# Patient Record
Sex: FEMALE | Race: WHITE | HISPANIC OR LATINO | Employment: FULL TIME | URBAN - METROPOLITAN AREA
[De-identification: names, ages, dates, MRNs, and addresses within clinical notes are randomized per-mention and may not be internally consistent; named-entity substitution may affect disease eponyms.]

---

## 2022-08-10 ENCOUNTER — LAB REQUISITION (OUTPATIENT)
Dept: LAB | Facility: CLINIC | Age: 42
End: 2022-08-10

## 2022-08-10 DIAGNOSIS — Z00.00 ENCOUNTER FOR GENERAL ADULT MEDICAL EXAMINATION WITHOUT ABNORMAL FINDINGS: ICD-10-CM

## 2022-08-10 LAB
CHOLEST SERPL-MCNC: 159 MG/DL
HDLC SERPL-MCNC: 64 MG/DL
LDLC SERPL CALC-MCNC: 82 MG/DL
NONHDLC SERPL-MCNC: 95 MG/DL
TRIGL SERPL-MCNC: 63 MG/DL

## 2022-08-10 PROCEDURE — 83718 ASSAY OF LIPOPROTEIN: CPT | Performed by: FAMILY MEDICINE

## 2022-08-11 ENCOUNTER — MEDICAL CORRESPONDENCE (OUTPATIENT)
Dept: HEALTH INFORMATION MANAGEMENT | Facility: CLINIC | Age: 42
End: 2022-08-11

## 2022-08-16 ENCOUNTER — TRANSCRIBE ORDERS (OUTPATIENT)
Dept: OTHER | Age: 42
End: 2022-08-16

## 2022-08-16 DIAGNOSIS — R20.2 NUMBNESS AND TINGLING OF BOTH UPPER EXTREMITIES: Primary | ICD-10-CM

## 2022-08-16 DIAGNOSIS — R20.0 NUMBNESS AND TINGLING OF BOTH UPPER EXTREMITIES: Primary | ICD-10-CM

## 2023-10-10 ENCOUNTER — APPOINTMENT (OUTPATIENT)
Dept: CT IMAGING | Facility: CLINIC | Age: 43
End: 2023-10-10
Attending: EMERGENCY MEDICINE
Payer: COMMERCIAL

## 2023-10-10 ENCOUNTER — HOSPITAL ENCOUNTER (EMERGENCY)
Facility: CLINIC | Age: 43
Discharge: HOME | End: 2023-10-10
Attending: EMERGENCY MEDICINE | Admitting: EMERGENCY MEDICINE
Payer: COMMERCIAL

## 2023-10-10 VITALS
SYSTOLIC BLOOD PRESSURE: 143 MMHG | BODY MASS INDEX: 32.14 KG/M2 | WEIGHT: 200 LBS | HEART RATE: 81 BPM | HEIGHT: 66 IN | RESPIRATION RATE: 16 BRPM | OXYGEN SATURATION: 100 % | DIASTOLIC BLOOD PRESSURE: 88 MMHG | TEMPERATURE: 97.7 F

## 2023-10-10 DIAGNOSIS — M54.2 CHRONIC NECK PAIN: Primary | ICD-10-CM

## 2023-10-10 DIAGNOSIS — G89.29 CHRONIC NECK PAIN: Primary | ICD-10-CM

## 2023-10-10 PROCEDURE — 250N000013 HC RX MED GY IP 250 OP 250 PS 637: Performed by: EMERGENCY MEDICINE

## 2023-10-10 PROCEDURE — 99284 EMERGENCY DEPT VISIT MOD MDM: CPT | Mod: 25 | Performed by: EMERGENCY MEDICINE

## 2023-10-10 PROCEDURE — 72128 CT CHEST SPINE W/O DYE: CPT

## 2023-10-10 PROCEDURE — 99284 EMERGENCY DEPT VISIT MOD MDM: CPT | Performed by: EMERGENCY MEDICINE

## 2023-10-10 PROCEDURE — 72125 CT NECK SPINE W/O DYE: CPT | Mod: 26 | Performed by: RADIOLOGY

## 2023-10-10 PROCEDURE — 72128 CT CHEST SPINE W/O DYE: CPT | Mod: 26 | Performed by: RADIOLOGY

## 2023-10-10 PROCEDURE — 72125 CT NECK SPINE W/O DYE: CPT

## 2023-10-10 RX ORDER — PREGABALIN 75 MG/1
75 CAPSULE ORAL 3 TIMES DAILY
COMMUNITY

## 2023-10-10 RX ORDER — CYCLOBENZAPRINE HCL 10 MG
10 TABLET ORAL 3 TIMES DAILY PRN
Qty: 20 TABLET | Refills: 0 | Status: SHIPPED | OUTPATIENT
Start: 2023-10-10 | End: 2023-10-16

## 2023-10-10 RX ORDER — CYCLOBENZAPRINE HCL 5 MG
10 TABLET ORAL ONCE
Status: COMPLETED | OUTPATIENT
Start: 2023-10-10 | End: 2023-10-10

## 2023-10-10 RX ORDER — IBUPROFEN 200 MG
TABLET ORAL EVERY 4 HOURS PRN
COMMUNITY

## 2023-10-10 RX ORDER — HYDROCODONE BITARTRATE AND ACETAMINOPHEN 5; 325 MG/1; MG/1
2 TABLET ORAL ONCE
Status: COMPLETED | OUTPATIENT
Start: 2023-10-10 | End: 2023-10-10

## 2023-10-10 RX ORDER — HYDROCODONE BITARTRATE AND ACETAMINOPHEN 5; 325 MG/1; MG/1
1 TABLET ORAL EVERY 6 HOURS PRN
Qty: 10 TABLET | Refills: 0 | Status: SHIPPED | OUTPATIENT
Start: 2023-10-10 | End: 2023-10-13

## 2023-10-10 RX ADMIN — CYCLOBENZAPRINE HYDROCHLORIDE 10 MG: 5 TABLET, FILM COATED ORAL at 11:20

## 2023-10-10 RX ADMIN — HYDROCODONE BITARTRATE AND ACETAMINOPHEN 2 TABLET: 5; 325 TABLET ORAL at 11:20

## 2023-10-10 ASSESSMENT — ACTIVITIES OF DAILY LIVING (ADL)
ADLS_ACUITY_SCORE: 33
ADLS_ACUITY_SCORE: 33

## 2023-10-10 NOTE — ED PROVIDER NOTES
"ED Provider Note  [facility]      History   No chief complaint on file.    HPI  Paulina Elena May is a 43 year old female who presents to the ER complaining about mid upper back pain and feeling of numbness in bilateral hands.  Patient says that she had a surgery done of her C5-C6 cervical spine in [place] 2015.  Patient says the surgery was done due to \"a hernia\" in her cervical spine.  Says that she never had pain to touch but was having to physical therapy and stretching to control the symptoms.  Patient says for the past 2 years she has been having worsening back pain.  The back pain is in the right side of her upper back and goes down to about midway.  Patient is that she has not been doing physical therapy or any treatment here.  Patient is never seen a neurosurgeon here in the [place].  Patient says that she is able to move her arms normally.  She has been taking ibuprofen occasionally at home.  Patient says that she was previously on Lyrica and Neurontin has but is currently not taking them.  Patient is accompanied in the ER with her significant other.    Past Medical History  History reviewed. No pertinent past medical history.  Past Surgical History:   Procedure Laterality Date    HEAD & NECK SURGERY      ORTHOPEDIC SURGERY       ibuprofen (ADVIL/MOTRIN) 200 MG tablet  pregabalin (LYRICA) 75 MG capsule      No Known Allergies  Family History  History reviewed. No pertinent family history.  Social History   Social History     Tobacco Use    Smoking status: Never    Smokeless tobacco: Never   Substance Use Topics    Alcohol use: Never    Drug use: Never      Past medical history, past surgical history, medications, allergies, family history, and social history were reviewed with the patient. No additional pertinent items.      A complete review of systems was performed with pertinent positives and negatives noted in the HPI, and all other systems " "negative.    Physical Exam   BP: (!) 143/88  Pulse: 81  Temp: 97.7  F (36.5  C)  Resp: 16  Height: 167 cm (5' 5.75\")  Weight: 90.7 kg (200 lb)  SpO2: 100 %  Physical Exam  Constitutional:       General: She is not in acute distress.     Appearance: She is well-developed. She is not ill-appearing, toxic-appearing or diaphoretic.   HENT:      Head: Normocephalic and atraumatic.   Neck:      Comments: No midline cervical tenderness to palpation.  Cardiovascular:      Rate and Rhythm: Normal rate and regular rhythm.      Heart sounds: Normal heart sounds.   Pulmonary:      Effort: Pulmonary effort is normal. No respiratory distress.      Breath sounds: Normal breath sounds.   Abdominal:      General: There is no distension.      Palpations: Abdomen is soft.      Tenderness: There is no abdominal tenderness. There is no rebound.   Musculoskeletal:         General: No tenderness.      Cervical back: Normal range of motion.      Comments: Mild upper back pain.  No skin changes.   Lymphadenopathy:      Cervical: No cervical adenopathy.   Skin:     General: Skin is warm and dry.   Neurological:      Mental Status: She is alert and oriented to person, place, and time.      Comments: Moving both arms normally.  Normal sensation.  Normal strength.   Psychiatric:         Mood and Affect: Mood normal.         Behavior: Behavior normal.         Thought Content: Thought content normal.           ED Course, Procedures, & Data      Procedures       Results for orders placed or performed during the hospital encounter of 10/10/23   Cervical spine CT w/o contrast     Status: None    Narrative    CT CERVICAL SPINE W/O CONTRAST, CT THORACIC SPINE W/O CONTRAST  10/10/2023 9:35 AM    Provided History: neck pain; hx of surgery at c5/c6, pain pain    Comparison: No prior similar studies    Technique: Using multidetector thin collimation helical acquisition  technique, axial, coronal and sagittal CT images through the cervical  spine and " thoracic were obtained without intravenous contrast.  Reconstructions were axial, coronal and sagittal planes.    Findings:  Cervical spine:  Postsurgical changes of intervertebral disc prosthesis placement at  C5-6.    The cervical vertebrae are normally aligned. No acute fracture or  subluxation. No prevertebral edema.     Straightening of normal cervical lordosis.     There is no disc height narrowing at any level. No spinal canal or  neural foraminal stenosis.    No abnormality of the paraspinous soft tissues.    Thoracic spine:   There is no acute fracture subluxation. There is no prevertebral  edema.     There is no spinal canal or foraminal stenosis at any level.     No soft tissue abnormality in the visualized paraspinous tissues  anteriorly.      Impression    Impression:   1. Cervical spine:  No acute fracture or subluxation. No spinal canal  stenosis..  2. Thoracic spine:  No acute fracture or subluxation. No spinal canal  stenosis..    RACHID COOPER MD         SYSTEM ID:  [id number]   CT Thoracic Spine w/o Contrast     Status: None    Narrative    CT CERVICAL SPINE W/O CONTRAST, CT THORACIC SPINE W/O CONTRAST  10/10/2023 9:35 AM    Provided History: neck pain; hx of surgery at c5/c6, pain pain    Comparison: No prior similar studies    Technique: Using multidetector thin collimation helical acquisition  technique, axial, coronal and sagittal CT images through the cervical  spine and thoracic were obtained without intravenous contrast.  Reconstructions were axial, coronal and sagittal planes.    Findings:  Cervical spine:  Postsurgical changes of intervertebral disc prosthesis placement at  C5-6.    The cervical vertebrae are normally aligned. No acute fracture or  subluxation. No prevertebral edema.     Straightening of normal cervical lordosis.     There is no disc height narrowing at any level. No spinal canal or  neural foraminal stenosis.    No abnormality of the paraspinous soft  tissues.    Thoracic spine:   There is no acute fracture subluxation. There is no prevertebral  edema.     There is no spinal canal or foraminal stenosis at any level.     No soft tissue abnormality in the visualized paraspinous tissues  anteriorly.      Impression    Impression:   1. Cervical spine:  No acute fracture or subluxation. No spinal canal  stenosis..  2. Thoracic spine:  No acute fracture or subluxation. No spinal canal  stenosis..    RACHID COOPER MD         SYSTEM ID:  [id number]     Medications   HYDROcodone-acetaminophen (NORCO) 5-325 MG per tablet 2 tablet (2 tablets Oral $Given 10/10/23 1120)   cyclobenzaprine (FLEXERIL) tablet 10 mg (10 mg Oral $Given 10/10/23 1120)                    No results found for any visits on 10/10/23.  Medications   HYDROcodone-acetaminophen (NORCO) 5-325 MG per tablet 2 tablet (has no administration in time range)   cyclobenzaprine (FLEXERIL) tablet 10 mg (has no administration in time range)     Labs Ordered and Resulted from Time of ED Arrival to Time of ED Departure - No data to display  Cervical spine CT w/o contrast    (Results Pending)   CT Thoracic Spine w/o Contrast    (Results Pending)          Critical care was not performed.     Medical Decision Making  The patient's presentation was of high complexity (a chronic illness severe exacerbation, progression, or side effect of treatment).    The patient's evaluation involved:  ordering and/or review of 2 test(s) in this encounter (see separate area of note for details)  discussion of management or test interpretation with another health professional (see separate area of note for details)    The patient's management necessitated moderate risk (prescription drug management including medications given in the ED).    Assessment & Plan    Patient is a 43-year-old female who presents to the ER complaining of worsening neck pain.  Patient has a history of having surgery done in this area approximately years ago and.   Patient currently has a normal neurologic exam.  We will obtain a CT of the cervical and thoracic spine to evaluate for possible abnormality.  Patient will be given some pain medications and a muscle relaxer.  Plan will be likely to discharge with outpatient follow-up if CT imaging is stable.    With negative CT cervical and thoracic spine.  Patient was given pain medications for discharge.  Plan to follow-up PCP and neurosurgery as needed for an outpatient.    I have reviewed the nursing notes. I have reviewed the findings, diagnosis, plan and need for follow up with the patient.    New Prescriptions    No medications on file       Final diagnoses:   Chronic neck pain       Gloria Avalos  [facility] EMERGENCY DEPARTMENT  10/10/2023     Gloria Avalos MD  10/10/23 0898

## 2023-10-10 NOTE — ED TRIAGE NOTES
Coming in with neck pain. States she had surgery on her neck in 2015.     Triage Assessment       Row Name 10/10/23 0739       Triage Assessment (Adult)    Airway WDL WDL       Respiratory WDL    Respiratory WDL WDL

## 2023-10-10 NOTE — DISCHARGE INSTRUCTIONS
Your CT neck and Thoracic spine are both normal.     Please make an appointment to follow up with [facility] (phone: [phone]) and [facility] (phone: [phone]) in 3-5 days even if entirely better.    Take the pain medications as prescribed.

## 2023-10-27 ENCOUNTER — OFFICE VISIT (OUTPATIENT)
Dept: FAMILY MEDICINE | Facility: CLINIC | Age: 43
End: 2023-10-27
Payer: COMMERCIAL

## 2023-10-27 VITALS
TEMPERATURE: 97.5 F | HEART RATE: 78 BPM | WEIGHT: 194.7 LBS | HEIGHT: 66 IN | DIASTOLIC BLOOD PRESSURE: 82 MMHG | BODY MASS INDEX: 31.29 KG/M2 | RESPIRATION RATE: 12 BRPM | SYSTOLIC BLOOD PRESSURE: 120 MMHG | OXYGEN SATURATION: 98 %

## 2023-10-27 DIAGNOSIS — Z78.9 NON-ENGLISH SPEAKING PATIENT: ICD-10-CM

## 2023-10-27 DIAGNOSIS — M54.2 NECK PAIN: Primary | ICD-10-CM

## 2023-10-27 PROCEDURE — 99203 OFFICE O/P NEW LOW 30 MIN: CPT | Performed by: NURSE PRACTITIONER

## 2023-10-27 NOTE — PROGRESS NOTES
"chief complaint    Hospital Follow-up Visit:    Hospital/Nursing Home/IP Rehab Facility: [facility]  Date of Admission: 10/10/23  Date of Discharge: 10/10/23  Reason(s) for Admission: Chronic neck pain    HPI:    Seen 10-10 for neck pain; seen by neuro-surgery and seen by physical therapy in [facility].  Able to move arms and hands fine. But Can get cramps in hands, arms, fingers when drying her hair. She feels the pain starts in her neck and radiates down her arms. If standing or sitting too long, can get pain.  Her arms and fingers can get numb while sleeping. Would like physical therapy. She stated after treatment she feels fine but with stress will get muscle tension, cramping, etc. Denied any weakness.     Per ED notes: \"mid upper back pain and feeling of numbness in bilateral hands.  Patient says that she had a surgery done of her C5-C6 cervical spine in [facility] 2015.  Patient says the surgery was done due to \"a hernia\" in her cervical spine.  Says that she never had pain to touch but was having to physical therapy and stretching to control the symptoms.  Patient says for the past 2 years she has been having worsening back pain.  The back pain is in the right side of her upper back and goes down to about midway.  Patient is that she has not been doing physical therapy or any treatment here.  Patient is never seen a neurosurgeon here in the [facility].  Patient says that she is able to move her arms normally.  She has been taking ibuprofen occasionally at home.  Patient says that she was previously on Lyrica and Neurontin has but is currently not taking them.  Patient is accompanied in the ER with her significant other\"       Was your hospitalization related to COVID-19? No   Problems taking medications regularly:  None  Medication changes since discharge: None  Problems adhering to non-medication therapy:  None    Summary of hospitalization:  [facility] " "hospital discharge summary reviewed  Diagnostic Tests/Treatments reviewed.  Follow up needed: physical therapy  Other Healthcare Providers Involved in Patient s Care:         None  Update since discharge: not changed.     /82 (BP Location: Left arm, Patient Position: Sitting, Cuff Size: Adult Regular)   Pulse 78   Temp 97.5  F (36.4  C) (Temporal)   Resp 12   Ht 1.67 m (5' 5.75\")   Wt 88.3 kg (194 lb 11.2 oz)   LMP 10/13/2023 (Exact Date)   SpO2 98%   BMI 31.66 kg/m    Exam:  Constitutional: healthy, alert, and no distress  Head: Normocephalic. No masses, lesions, tenderness or abnormalities    Musculoskeletal: extremities normal- no gross deformities noted, gait normal, and normal muscle tone. Neck ROM intact, no cervical pain. ROM intact to bilateral shoulders. Neg tinel sign  Skin: no suspicious lesions or rashes  Neurologic: negative, Gait normal. Reflexes normal and symmetric. Sensation grossly WNL.  Psychiatric: mentation appears normal and affect normal/bright    Encounter Diagnoses   Name Primary?    Neck pain Yes    Non-English speaking patient      - phone interpretor used.   - I reviewed ED notes and imaging today with patient.   - requesting physical therapy; ordered. Can continue Ibuprofens.   - I tried to discussed muscle tension and give reassurance today.     Plan of care communicated with patient and family             "

## 2023-12-04 ENCOUNTER — APPOINTMENT (OUTPATIENT)
Dept: INTERPRETER SERVICES | Facility: CLINIC | Age: 43
End: 2023-12-04
Payer: COMMERCIAL

## 2023-12-13 ENCOUNTER — THERAPY VISIT (OUTPATIENT)
Dept: PHYSICAL THERAPY | Facility: CLINIC | Age: 43
End: 2023-12-13
Attending: NURSE PRACTITIONER
Payer: COMMERCIAL

## 2023-12-13 DIAGNOSIS — M54.2 NECK PAIN: ICD-10-CM

## 2023-12-13 PROCEDURE — 97162 PT EVAL MOD COMPLEX 30 MIN: CPT | Mod: GP | Performed by: PHYSICAL THERAPIST

## 2023-12-13 PROCEDURE — 97110 THERAPEUTIC EXERCISES: CPT | Mod: GP | Performed by: PHYSICAL THERAPIST

## 2023-12-13 NOTE — PROGRESS NOTES
PHYSICAL THERAPY EVALUATION  Type of Visit: Evaluation    See electronic medical record for Abuse and Falls Screening details.    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  Relevant medical history:     Dates & types of surgery: C5C6 disc posthesis 10 years ago    Prior diagnostic imaging/testing results: CT scan     Prior therapy history for the same diagnosis, illness or injury:        Prior Level of Function  Transfers:   Ambulation:   ADL:   IADL:     Living Environment  Social support: With a significant other or spouse   Type of home: Apartment/condo   Stairs to enter the home:         Ramp:     Stairs inside the home:         Help at home:    Equipment owned:       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  Hobbies/Interests:      Patient goals for therapy: Be able to move and do things without severe pain    Pain assessment:      Objective   CERVICAL SPINE EVALUATION  PAIN: Pain Level at Rest: 4/10  Pain Level with Use: 10/10  Pain Location: B neck to low back  Pain Quality: Stabbing  Pain Frequency: constant  Pain is Worst: with more activity  Pain is Exacerbated By: prolonged sitting, standing or lying down brings on cramping/nunbness in arms and hands, hard to lift arms up (arm sx have been going on for the past year)   Pain is Relieved By: Lyrica, neurontin - did pilates and yoga in [facility] and that helped.  Pain Progression: Worsened  INTEGUMENTARY (edema, incisions):   POSTURE:   GAIT:   Weightbearing Status:   Assistive Device(s):   Gait Deviations:   BALANCE/PROPRIOCEPTION:   WEIGHTBEARING ALIGNMENT:   ROM:   Work mechanical stresses: repetitive motions   Leisure mechanical stresses:   Functional disability score (NDI):    VAS score (0-10): 10/10 at worst    ADDITIONAL HISTORY:  Present symptoms:  3/10 and numbness in L UE and  medial 3 fingers cramping.  Pain quality: Stabbing  Paresthesia (yes/no):  yes    Symptoms (improving/unchanging/worsening):  worsening  Symptoms commenced as a result of: unknown - has been an issue all her life   Condition occurred in the following environment:      Symptoms at onset (neck/arm/forearm/headache):   Constant symptoms (neck/arm/forearm/headache):   Intermittent symptoms (neck/arm/forearm/headache):     Symptoms are made worse with the following:    Symptoms are made better with the following:     Disturbed sleep (yes/no): yes  Number of pillows: 1 (she has gotten a really good pillow  Sleeping postures (prone/sup/side R/L): sup/side R/L    Previous episodes (0/1-5/6-10/11+): all her life Year of first episode: since a young child    Previous history:   Previous treatments:     Specific Questions: (as reported by the patient)  Dizziness/Tinnitus/Nausea/Swallowing (pos/neg): neg  Gait/Upper Limbs (normal/abnormal): abnormal  Medications (nil/NSAIDS/anlag/steroids/anticoag/other):    Current Outpatient Medications   Medication    ibuprofen (ADVIL/MOTRIN) 200 MG tablet    pregabalin (LYRICA) 75 MG capsule     No current facility-administered medications for this visit.         Medical allergies:  see epic  General health (excellent/good/fair/poor):  good  Pertinent medical history:    Imaging (None/Xray/MRI/Other):  CT scam  Recent or major surgery (yes/no): mp  Night pain (yes/no): mp  Accidents (yes/no): mp  Unexplained weight loss (yes/no): mp  Barriers at home: mp  Other red flags: mp    Postural Observation:   Sitting:  slouched   Protruded head: Yes Lateral deviation:  Nil.  Change of posture: Better Wry Neck: Nil  Other observations/functional baselines:      Neurological:  Motor Deficit:  MMT B shoulder abd 5/5, bicep 5/5, tricep 5/5, wrist ext 5/5, R thumb ext 5/5, L thumb ext 4/5   Reflexes:  not assessed  Sensory Deficit:  not assessed   Neurodynamic tests:      Movement Loss:   Malik Mod Min  Nil Symptoms   Protrusion        Flexion    X +   Retraction    X    Extension    X Feels good   Lateral flexion R    X    Lateral flexion L    X    Rotation R    X    Rotation L    X    Shoulder AROM:  WNL in flexion and abd     strength  R: 55 lbs;  L: 35 lbs  Test Movements:   During: produces, abolishes, increases, decreases, no effect, centralizing, peripheralizing  After: better, worse, no better, no worse, no effect, centralized, peripheralized    Symptomatic response Mechanical response    During testing After testing Effect - increased ROM, decreased ROM, or key functional test No Effect   Pretest symptoms sitting: 3/10 at L neck, numbness in L UE and cramping at medial 3 fingers     Rep PRO       Rep RET No Effect    No Effect     X   Rep EXT Decreases    Better     X            Pretest symptoms lying:     During testing After testing Effect - increased ROM, decreased ROM, or key functional test No Effect   Rep RET       Rep RET EXT         If required, pretest symptoms sitting:      During testing After testing Effect - increased ROM, decreased ROM, or key functional test No Effect   Rep LF-R       Rep LF-L       Rep ROT-R       Rep ROT-L       Rep FLEX         Static Tests:       Other Tests:     Provisional Classification:  Inconclusive/Other -     Potential Drivers of Pain and/or Disability:     Principle of Management:  Education:  Traffic light guide for pain and frequency of HEP    Equipment provided:  none  Mechanical therapy (Y/N):  Y   Extension principle:  repeated cervical extension x 10 reps, every 2-3 hrs Other:  Functional strengthening - UE and scapular stabilization  MYOTOMES:   DTR S:   CORD SIGNS:   DERMATOMES:   NEURAL TENSION:   FLEXIBILITY:    SPECIAL TESTS:   PALPATION:   SPINAL SEGMENTAL CONCLUSIONS:       Assessment & Plan   CLINICAL IMPRESSIONS  Medical Diagnosis: Neck pain    Treatment Diagnosis: Neck pain   Impression/Assessment: Patient is a 43 year old female with B  neck/back complaints.  The following significant findings have been identified: Pain, Decreased strength, Impaired muscle performance, and Decreased activity tolerance. These impairments interfere with their ability to perform work tasks, recreational activities, and driving  as compared to previous level of function.     Clinical Decision Making (Complexity):  Clinical Presentation: Evolving/Changing  Clinical Presentation Rationale: based on medical and personal factors listed in PT evaluation  Clinical Decision Making (Complexity): Moderate complexity    PLAN OF CARE  Treatment Interventions:  Interventions: Neuromuscular Re-education, Therapeutic Activity, Therapeutic Exercise    Long Term Goals     PT Goal 1  Goal Identifier: Reaching overhead for prolonged period  Goal Description: Pt will be able to reach overhead to dry her hair with 0-3/10 concordant neck/UE pain.  Rationale: to maximize safety and independence with performance of ADLs and functional tasks;to maximize safety and independence with self cares  Target Date: 02/07/24      Frequency of Treatment: 1x/week  Duration of Treatment: 8 weeks    Recommended Referrals to Other Professionals:   Education Assessment:   Learner/Method: Pictures/Video;Demonstration  Education Comments: needs  for understanding    Risks and benefits of evaluation/treatment have been explained.   Patient/Family/caregiver agrees with Plan of Care.     Evaluation Time:     PT Eval, Moderate Complexity Minutes (22238): 20   Present: Yes: Language: Mozambican, ID Number/Identifier: [id number]     Signing Clinician: Kavita Johnson, PT      [facility]                                                                                   OUTPATIENT PHYSICAL THERAPY      PLAN OF TREATMENT FOR OUTPATIENT REHABILITATION   Patient's Last Name, First Name, M.IPaulina Mckinney YOB: 1980   Provider's Name   [facility]   Medical Record No.  [id number]     Onset Date: 10/27/23  Start of Care Date: 12/13/23     Medical Diagnosis:  Neck pain      PT Treatment Diagnosis:  Neck pain Plan of Treatment  Frequency/Duration: 1x/week/ 8 weeks    Certification date from 12/13/23 to 02/07/24         See note for plan of treatment details and functional goals     Kavita Johnson, PT                         I CERTIFY THE NEED FOR THESE SERVICES FURNISHED UNDER        THIS PLAN OF TREATMENT AND WHILE UNDER MY CARE     (Physician attestation of this document indicates review and certification of the therapy plan).              Referring Provider:  Cheryle Lagos    Initial Assessment  See Epic Evaluation- Start of Care Date: 12/13/23

## 2023-12-19 ENCOUNTER — THERAPY VISIT (OUTPATIENT)
Dept: PHYSICAL THERAPY | Facility: CLINIC | Age: 43
End: 2023-12-19
Attending: NURSE PRACTITIONER
Payer: COMMERCIAL

## 2023-12-19 DIAGNOSIS — M54.2 NECK PAIN: Primary | ICD-10-CM

## 2023-12-19 PROCEDURE — 97110 THERAPEUTIC EXERCISES: CPT | Mod: GP | Performed by: PHYSICAL THERAPIST

## 2023-12-19 PROCEDURE — 97112 NEUROMUSCULAR REEDUCATION: CPT | Mod: GP | Performed by: PHYSICAL THERAPIST

## 2023-12-27 ENCOUNTER — THERAPY VISIT (OUTPATIENT)
Dept: PHYSICAL THERAPY | Facility: CLINIC | Age: 43
End: 2023-12-27
Attending: NURSE PRACTITIONER
Payer: COMMERCIAL

## 2023-12-27 DIAGNOSIS — M54.2 NECK PAIN: Primary | ICD-10-CM

## 2023-12-27 PROCEDURE — 97112 NEUROMUSCULAR REEDUCATION: CPT | Mod: GP | Performed by: PHYSICAL THERAPIST

## 2023-12-27 PROCEDURE — 97110 THERAPEUTIC EXERCISES: CPT | Mod: GP | Performed by: PHYSICAL THERAPIST

## 2024-01-10 ENCOUNTER — THERAPY VISIT (OUTPATIENT)
Dept: PHYSICAL THERAPY | Facility: CLINIC | Age: 44
End: 2024-01-10
Payer: COMMERCIAL

## 2024-01-10 DIAGNOSIS — M54.2 NECK PAIN: Primary | ICD-10-CM

## 2024-01-10 PROCEDURE — 97110 THERAPEUTIC EXERCISES: CPT | Mod: GP | Performed by: PHYSICAL THERAPIST

## 2024-01-10 PROCEDURE — 97112 NEUROMUSCULAR REEDUCATION: CPT | Mod: GP | Performed by: PHYSICAL THERAPIST

## 2024-01-29 ENCOUNTER — THERAPY VISIT (OUTPATIENT)
Dept: PHYSICAL THERAPY | Facility: CLINIC | Age: 44
End: 2024-01-29
Payer: COMMERCIAL

## 2024-01-29 DIAGNOSIS — M54.2 NECK PAIN: Primary | ICD-10-CM

## 2024-01-29 PROCEDURE — 97014 ELECTRIC STIMULATION THERAPY: CPT | Mod: GP | Performed by: PHYSICAL THERAPIST

## 2024-01-30 ENCOUNTER — LAB REQUISITION (OUTPATIENT)
Dept: LAB | Facility: CLINIC | Age: 44
End: 2024-01-30
Payer: COMMERCIAL

## 2024-01-30 DIAGNOSIS — Z00.00 ENCOUNTER FOR GENERAL ADULT MEDICAL EXAMINATION WITHOUT ABNORMAL FINDINGS: ICD-10-CM

## 2024-01-30 LAB
ALBUMIN SERPL BCG-MCNC: 4.3 G/DL (ref 3.5–5.2)
ALP SERPL-CCNC: 78 U/L (ref 40–150)
ALT SERPL W P-5'-P-CCNC: 16 U/L (ref 0–50)
ANION GAP SERPL CALCULATED.3IONS-SCNC: 12 MMOL/L (ref 7–15)
AST SERPL W P-5'-P-CCNC: 19 U/L (ref 0–45)
BILIRUB SERPL-MCNC: 0.4 MG/DL
BUN SERPL-MCNC: 10 MG/DL (ref 6–20)
CALCIUM SERPL-MCNC: 9.3 MG/DL (ref 8.6–10)
CHLORIDE SERPL-SCNC: 105 MMOL/L (ref 98–107)
CHOLEST SERPL-MCNC: 176 MG/DL
CREAT SERPL-MCNC: 0.79 MG/DL (ref 0.51–0.95)
DEPRECATED HCO3 PLAS-SCNC: 23 MMOL/L (ref 22–29)
EGFRCR SERPLBLD CKD-EPI 2021: >90 ML/MIN/1.73M2
FASTING STATUS PATIENT QL REPORTED: NO
GLUCOSE SERPL-MCNC: 85 MG/DL (ref 70–99)
HBV SURFACE AB SERPL IA-ACNC: 26.6 M[IU]/ML
HBV SURFACE AB SERPL IA-ACNC: REACTIVE M[IU]/ML
HBV SURFACE AG SERPL QL IA: NONREACTIVE
HCV AB SERPL QL IA: NONREACTIVE
HDLC SERPL-MCNC: 58 MG/DL
HIV 1+2 AB+HIV1 P24 AG SERPL QL IA: NONREACTIVE
LDLC SERPL CALC-MCNC: 100 MG/DL
NONHDLC SERPL-MCNC: 118 MG/DL
POTASSIUM SERPL-SCNC: 4.3 MMOL/L (ref 3.4–5.3)
PROT SERPL-MCNC: 7.8 G/DL (ref 6.4–8.3)
SODIUM SERPL-SCNC: 140 MMOL/L (ref 135–145)
T PALLIDUM AB SER QL: NONREACTIVE
TRIGL SERPL-MCNC: 88 MG/DL

## 2024-01-30 PROCEDURE — 80053 COMPREHEN METABOLIC PANEL: CPT | Mod: ORL | Performed by: PEDIATRICS

## 2024-01-30 PROCEDURE — 87591 N.GONORRHOEAE DNA AMP PROB: CPT | Mod: ORL | Performed by: PEDIATRICS

## 2024-01-30 PROCEDURE — 87491 CHLMYD TRACH DNA AMP PROBE: CPT | Mod: ORL | Performed by: PEDIATRICS

## 2024-01-30 PROCEDURE — 86481 TB AG RESPONSE T-CELL SUSP: CPT | Mod: ORL | Performed by: PEDIATRICS

## 2024-01-30 PROCEDURE — 87340 HEPATITIS B SURFACE AG IA: CPT | Mod: ORL | Performed by: PEDIATRICS

## 2024-01-30 PROCEDURE — 86706 HEP B SURFACE ANTIBODY: CPT | Mod: ORL | Performed by: PEDIATRICS

## 2024-01-30 PROCEDURE — 86803 HEPATITIS C AB TEST: CPT | Mod: ORL | Performed by: PEDIATRICS

## 2024-01-30 PROCEDURE — 87389 HIV-1 AG W/HIV-1&-2 AB AG IA: CPT | Mod: ORL | Performed by: PEDIATRICS

## 2024-01-30 PROCEDURE — 86780 TREPONEMA PALLIDUM: CPT | Mod: ORL | Performed by: PEDIATRICS

## 2024-01-30 PROCEDURE — 87624 HPV HI-RISK TYP POOLED RSLT: CPT | Mod: ORL | Performed by: PEDIATRICS

## 2024-01-30 PROCEDURE — 80061 LIPID PANEL: CPT | Mod: ORL | Performed by: PEDIATRICS

## 2024-01-31 ENCOUNTER — LAB REQUISITION (OUTPATIENT)
Dept: LAB | Facility: CLINIC | Age: 44
End: 2024-01-31
Payer: COMMERCIAL

## 2024-01-31 ENCOUNTER — TRANSCRIBE ORDERS (OUTPATIENT)
Dept: OTHER | Age: 44
End: 2024-01-31

## 2024-01-31 ENCOUNTER — MEDICAL CORRESPONDENCE (OUTPATIENT)
Dept: SCHEDULING | Facility: CLINIC | Age: 44
End: 2024-01-31
Payer: COMMERCIAL

## 2024-01-31 DIAGNOSIS — Z00.00 HEALTHCARE MAINTENANCE: Primary | ICD-10-CM

## 2024-01-31 DIAGNOSIS — Z00.00 ENCOUNTER FOR GENERAL ADULT MEDICAL EXAMINATION WITHOUT ABNORMAL FINDINGS: ICD-10-CM

## 2024-01-31 LAB
GAMMA INTERFERON BACKGROUND BLD IA-ACNC: 0.03 IU/ML
M TB IFN-G BLD-IMP: NEGATIVE
M TB IFN-G CD4+ BCKGRND COR BLD-ACNC: 9.97 IU/ML
MITOGEN IGNF BCKGRD COR BLD-ACNC: 0.03 IU/ML
MITOGEN IGNF BCKGRD COR BLD-ACNC: 0.04 IU/ML
QUANTIFERON MITOGEN: 10 IU/ML
QUANTIFERON NIL TUBE: 0.03 IU/ML
QUANTIFERON TB1 TUBE: 0.07 IU/ML
QUANTIFERON TB2 TUBE: 0.06

## 2024-02-01 LAB
C TRACH DNA SPEC QL NAA+PROBE: NEGATIVE
HUMAN PAPILLOMA VIRUS 16 DNA: NEGATIVE
HUMAN PAPILLOMA VIRUS 18 DNA: NEGATIVE
HUMAN PAPILLOMA VIRUS FINAL DIAGNOSIS: ABNORMAL
HUMAN PAPILLOMA VIRUS OTHER HR: POSITIVE
N GONORRHOEA DNA SPEC QL NAA+PROBE: NEGATIVE

## 2024-02-15 ENCOUNTER — APPOINTMENT (OUTPATIENT)
Dept: INTERPRETER SERVICES | Facility: CLINIC | Age: 44
End: 2024-02-15
Payer: COMMERCIAL

## 2024-03-15 ENCOUNTER — HOSPITAL ENCOUNTER (OUTPATIENT)
Dept: MAMMOGRAPHY | Facility: CLINIC | Age: 44
Discharge: HOME | End: 2024-03-15
Attending: PEDIATRICS | Admitting: PEDIATRICS
Payer: COMMERCIAL

## 2024-03-15 DIAGNOSIS — Z00.00 HEALTHCARE MAINTENANCE: ICD-10-CM

## 2024-03-15 PROCEDURE — 77067 SCR MAMMO BI INCL CAD: CPT

## 2024-03-21 ENCOUNTER — LAB REQUISITION (OUTPATIENT)
Dept: LAB | Facility: CLINIC | Age: 44
End: 2024-03-21
Payer: COMMERCIAL

## 2024-03-21 DIAGNOSIS — Z12.4 ENCOUNTER FOR SCREENING FOR MALIGNANT NEOPLASM OF CERVIX: ICD-10-CM

## 2024-03-21 PROCEDURE — 87491 CHLMYD TRACH DNA AMP PROBE: CPT | Mod: ORL | Performed by: NURSE PRACTITIONER

## 2024-03-21 PROCEDURE — 87591 N.GONORRHOEAE DNA AMP PROB: CPT | Mod: ORL | Performed by: NURSE PRACTITIONER

## 2024-03-21 PROCEDURE — 87624 HPV HI-RISK TYP POOLED RSLT: CPT | Mod: ORL | Performed by: NURSE PRACTITIONER

## 2024-03-21 PROCEDURE — G0145 SCR C/V CYTO,THINLAYER,RESCR: HCPCS | Mod: ORL | Performed by: NURSE PRACTITIONER

## 2024-03-22 LAB
C TRACH DNA SPEC QL NAA+PROBE: NEGATIVE
N GONORRHOEA DNA SPEC QL NAA+PROBE: NEGATIVE

## 2024-03-25 LAB
BKR LAB AP GYN ADEQUACY: NORMAL
BKR LAB AP GYN INTERPRETATION: NORMAL
BKR LAB AP HPV REFLEX: NORMAL
BKR LAB AP LMP: NORMAL
BKR LAB AP PREVIOUS ABNORMAL: NORMAL
PATH REPORT.COMMENTS IMP SPEC: NORMAL
PATH REPORT.COMMENTS IMP SPEC: NORMAL
PATH REPORT.RELEVANT HX SPEC: NORMAL

## 2024-03-27 LAB
HUMAN PAPILLOMA VIRUS 16 DNA: NEGATIVE
HUMAN PAPILLOMA VIRUS 18 DNA: NEGATIVE
HUMAN PAPILLOMA VIRUS FINAL DIAGNOSIS: ABNORMAL
HUMAN PAPILLOMA VIRUS OTHER HR: POSITIVE

## 2024-04-29 ENCOUNTER — APPOINTMENT (OUTPATIENT)
Dept: INTERPRETER SERVICES | Facility: CLINIC | Age: 44
End: 2024-04-29
Payer: COMMERCIAL

## 2024-05-02 NOTE — PROGRESS NOTES
DISCHARGE  Reason for Discharge: No further expectation of progress.    Equipment Issued: none    Discharge Plan: Patient to continue home program.    Referring Provider:  Cheryle Lagos

## 2025-08-08 ENCOUNTER — LAB REQUISITION (OUTPATIENT)
Dept: LAB | Facility: CLINIC | Age: 45
End: 2025-08-08
Payer: COMMERCIAL

## 2025-08-08 DIAGNOSIS — Z23 ENCOUNTER FOR IMMUNIZATION: ICD-10-CM

## 2025-08-08 LAB
HAV AB SER QL IA: REACTIVE
HBV CORE AB SERPL QL IA: NONREACTIVE
HBV SURFACE AB SERPL IA-ACNC: 15.5 M[IU]/ML
HBV SURFACE AB SERPL IA-ACNC: REACTIVE M[IU]/ML
HBV SURFACE AG SERPL QL IA: NONREACTIVE

## 2025-08-08 PROCEDURE — 86708 HEPATITIS A ANTIBODY: CPT | Mod: ORL | Performed by: NURSE PRACTITIONER

## 2025-08-08 PROCEDURE — 86704 HEP B CORE ANTIBODY TOTAL: CPT | Mod: ORL | Performed by: NURSE PRACTITIONER

## 2025-08-08 PROCEDURE — 87340 HEPATITIS B SURFACE AG IA: CPT | Mod: ORL | Performed by: NURSE PRACTITIONER

## 2025-08-08 PROCEDURE — 86765 RUBEOLA ANTIBODY: CPT | Mod: ORL | Performed by: NURSE PRACTITIONER

## 2025-08-08 PROCEDURE — 86762 RUBELLA ANTIBODY: CPT | Mod: ORL | Performed by: NURSE PRACTITIONER

## 2025-08-08 PROCEDURE — 86706 HEP B SURFACE ANTIBODY: CPT | Mod: ORL | Performed by: NURSE PRACTITIONER

## 2025-08-08 PROCEDURE — 86481 TB AG RESPONSE T-CELL SUSP: CPT | Mod: ORL | Performed by: NURSE PRACTITIONER

## 2025-08-08 PROCEDURE — 86787 VARICELLA-ZOSTER ANTIBODY: CPT | Mod: ORL | Performed by: NURSE PRACTITIONER

## 2025-08-08 PROCEDURE — 86735 MUMPS ANTIBODY: CPT | Mod: ORL | Performed by: NURSE PRACTITIONER

## 2025-08-11 LAB
GAMMA INTERFERON BACKGROUND BLD IA-ACNC: 0.04 IU/ML
M TB IFN-G BLD-IMP: NEGATIVE
M TB IFN-G CD4+ BCKGRND COR BLD-ACNC: 9.96 IU/ML
MEV IGG SER IA-ACNC: 44.8 AU/ML
MEV IGG SER IA-ACNC: POSITIVE
MITOGEN IGNF BCKGRD COR BLD-ACNC: -0.01 IU/ML
MITOGEN IGNF BCKGRD COR BLD-ACNC: 0 IU/ML
MUMPS ANTIBODY IGG INSTRUMENT VALUE: 165 AU/ML
MUV IGG SER QL IA: POSITIVE
QUANTIFERON MITOGEN: 10 IU/ML
QUANTIFERON NIL TUBE: 0.04 IU/ML
QUANTIFERON TB1 TUBE: 0.04 IU/ML
QUANTIFERON TB2 TUBE: 0.03
RUBV IGG SERPL QL IA: 9.62 INDEX
RUBV IGG SERPL QL IA: POSITIVE
VZV IGG SER QL IA: 15 S/CO
VZV IGG SER QL IA: POSITIVE